# Patient Record
Sex: MALE | Race: WHITE
[De-identification: names, ages, dates, MRNs, and addresses within clinical notes are randomized per-mention and may not be internally consistent; named-entity substitution may affect disease eponyms.]

---

## 2018-12-01 ENCOUNTER — HOSPITAL ENCOUNTER (EMERGENCY)
Dept: HOSPITAL 75 - ER | Age: 43
LOS: 1 days | Discharge: HOME | End: 2018-12-02
Payer: COMMERCIAL

## 2018-12-01 VITALS — HEIGHT: 72 IN | WEIGHT: 185 LBS | BODY MASS INDEX: 25.06 KG/M2

## 2018-12-01 DIAGNOSIS — K04.7: Primary | ICD-10-CM

## 2018-12-01 PROCEDURE — 96372 THER/PROPH/DIAG INJ SC/IM: CPT

## 2018-12-01 PROCEDURE — 99284 EMERGENCY DEPT VISIT MOD MDM: CPT

## 2018-12-02 VITALS — DIASTOLIC BLOOD PRESSURE: 93 MMHG | SYSTOLIC BLOOD PRESSURE: 128 MMHG

## 2018-12-02 NOTE — ED EENT
History of Present Illness


General


Stated Complaint:  DENTAL PAIN


Source:  patient





History of Present Illness


Date Seen by Provider:  Dec 2, 2018


Time Seen by Provider:  00:52


Initial Comments


PT ARRIVES VIA POV


C/O DENTAL PAIN--RIGHT UPPER MOLAR AREA-SINCE WEDNESDAY


STATES HE HAS HAD INCREASED PAIN SINCE YESTERDAY


NOTICED SWELLING TODAY


HAS HAD SUBJECTIVE FEVER AND CHILLS


HAS EXTENSIVE DENTAL PROBLEMS BUT DENIES PRIOR PROBLEMS WITH THIS PARTICULAR 

TOOTH AND HAS NOT HAD ANY DENTAL WORK ON THIS PARTICULAR TOOTH


SAW HIS DENTIST, DR. MARTINI, 2 WEEKS AGO FOR A DIFFERENT PROBLEM


NO RECENT ANTIBIOTICS.





Allergies and Home Medications


Allergies


Coded Allergies:  


     No Known Drug Allergies (Unverified , 12/2/18)





Home Medications


Amoxicillin/Potassium Clav 1 Each Tablet, 1 EACH PO BID


   Prescribed by: DARIN PASTOR on 12/2/18 0117


Lidocaine HCl 15 Ml Solution, 15 ML MM Q 1-2 HOURS


   Prescribed by: DARIN PASTOR on 12/2/18 0117


Tramadol HCl 50 Mg Tablet, 50 MG PO Q4H


   Prescribed by: DARIN PASTOR on 12/2/18 0117





Patient Home Medication List


Home Medication List Reviewed:  Yes





Review of Systems


Review of Systems


Constitutional:  see HPI, chills, fever


Eyes:  No Symptoms Reported


Ears:  No Symptoms Reported


Nose:  no symptoms reported


Mouth:  see HPI, pain, swelling


Throat:  no symptoms reported


Respiratory:  no symptoms reported


Cardiovascular:  no symptoms reported


Gastrointestinal:  no symptoms reported


Musculoskeletal:  no symptoms reported


Skin:  no symptoms reported


Neurological:  No Symptoms Reported


Hematologic/Lymphatic:  No Symptoms Reported


Immunological/Allergic:  no symptoms reported





Past Medical-Social-Family Hx


Patient Social History


Recent Foreign Travel:  No


Contact w/Someone Who Travel:  No





Past Medical History


Surgeries:  No


Respiratory:  No


Cardiac:  No


Neurological:  No


Genitourinary:  No


Gastrointestinal:  No


Musculoskeletal:  No


Endocrine:  No


HEENT:  Yes (DENTAL )


Psychosocial:  No


Integumentary:  No


Blood Disorders:  No





Physical Exam


Height, Weight, BMI


Height: '"


Weight: lbs. oz. kg;  BMI


Method:


General Appearance:  WD/WN, no apparent distress


Eyes:  bilateral eye normal inspection, bilateral eye PERRL, bilateral eye EOMI


Ears:  bilateral ear auricle normal, bilateral ear canal normal, bilateral ear 

TM normal


Nose:  normal inspection


Mouth/Throat:  dental tenderness; No excessive drooling; mandibular swelling, 

maxillary swelling, trismus, other (RIGHT UPPER PREMOLAR WITH MARKED 

TENDERNESS. UPPER GUM SWELLING AND ERYTHEMA, NO AREAS OF FLUCTUANCE. + DENTAL 

DECAY. MODERATE SWELLING TO ENTIRE RIGHT CHEEK, EXTENDING TO JUST BELOW RIGHT 

EYE, AND DOWN TO MANDIBLE. PT HAVING DIFFICULTY OPENING MOUTH. RIGHT SIDE OF 

FACE ERYTHEMATOUS AND WARM AND TENDER TO TOUCH. )


Neck:  full range of motion, supple, lymphadenopathy (R)


Cardiovascular:  regular rate, rhythm, no murmur


Respiratory:  normal breath sounds


Neurologic/Psychiatric:  CNs II-XII nml as tested, no motor/sensory deficits, 

alert, normal mood/affect, oriented x 3


Skin:  normal color, warm/dry





Progress/Results/Core Measures


Results/Orders


My Orders








Progress


Progress Note :  


Progress Note


ADVISED IV, LABS, CT, AND IV ANTIBIOTICS AND PAIN MEDICATIONS AND PT ADAMANTLY 

REFUSES--STATES "JUST WANT SOME ANTIBIOTICS AND PAIN MEDICINES AND I'LL SEE MY 

DR. NEXT FRIDAY" 


STRONGLY ENCOURAGED PT TO SEE HIS DENTIST TOMORROW, BUT STATES HE IS WORKING 

OUT OF TOWN THIS WEEK AND WILL NOT BE BACK UNTIL THEN. 


STRONGLY ENCOURAGED TO SEEK TREATMENT SOONER THAN FRIDAY.





Departure


Impression





 Primary Impression:  


 DENTAL ABSCESS


Disposition:  01 HOME, SELF-CARE


Condition:  Stable





Departure-Patient Inst.


Referrals:  


SANGEETHA PATEL MD (PCP/Family)


Primary Care Physician


Patient Instructions:  Tooth Abscess (DC)





Add. Discharge Instructions:  


FREQUENT SALT WATER SWISHES





TYLENOL 1 GRAM AND MOTRIN 800 MG 4 TIMES A DAY FOR PAIN OR FEVER





LOTS OF CLEAR LIQUIDS





FOLLOW UP WITH YOUR DENTIST NEXT WEEK FOR FURTHER CARE, RETURN TO ER IF WORSE


Scripts


Tramadol HCl (Ultram) 50 Mg Tablet


50 MG PO Q4H, #20 TAB


   Prov: DARIN PASTOR DO         12/2/18 


Lidocaine HCl (Lidocaine HCl Viscous) 15 Ml Solution


15 ML MM Q 1-2 HOURS for Pain, #100 ML


   Prov: DARIN PASTOR DO         12/2/18 


Amoxicillin/Potassium Clav (Augmentin 875-125 Tablet) 1 Each Tablet


1 EACH PO BID for INFECTION, #20 TAB


   Prov: DARIN PASTOR DO         12/2/18











DARIN PASTOR DO Dec 2, 2018 01:01

## 2020-06-24 ENCOUNTER — HOSPITAL ENCOUNTER (OUTPATIENT)
Dept: HOSPITAL 75 - CARD | Age: 45
End: 2020-06-24
Attending: PHYSICIAN ASSISTANT
Payer: COMMERCIAL

## 2020-06-24 DIAGNOSIS — E78.2: ICD-10-CM

## 2020-06-24 DIAGNOSIS — I10: Primary | ICD-10-CM

## 2020-06-24 DIAGNOSIS — R60.9: ICD-10-CM

## 2020-06-24 PROCEDURE — 93306 TTE W/DOPPLER COMPLETE: CPT

## 2020-07-21 ENCOUNTER — HOSPITAL ENCOUNTER (OUTPATIENT)
Dept: HOSPITAL 75 - RAD | Age: 45
End: 2020-07-21
Attending: FAMILY MEDICINE
Payer: COMMERCIAL

## 2020-07-21 DIAGNOSIS — I10: Primary | ICD-10-CM

## 2020-07-21 DIAGNOSIS — R60.0: ICD-10-CM

## 2020-07-21 DIAGNOSIS — R80.9: ICD-10-CM

## 2020-07-21 PROCEDURE — 76770 US EXAM ABDO BACK WALL COMP: CPT

## 2020-07-21 PROCEDURE — 93975 VASCULAR STUDY: CPT

## 2020-07-21 NOTE — DIAGNOSTIC IMAGING REPORT
PROCEDURE: 

US Renal/Bladder.



TECHNIQUE: 

Multiple Real-time grayscale images were obtained over the

kidneys in various projections bilaterally. Color Doppler

ultrasound was also performed of the bilateral renal arteries.



INDICATION:  

Edema. Hypertension. Proteinuria.



COMPARISON:  

None.



FINDINGS: 



Right: 

The right kidney measures 12.7 cm in length. The renal cortical

thickness and echogenicity are within normal limits. There is no

evidence of calculi, solid focal mass, or hydronephrosis. No

perinephric fluid collections are identified.



Left: 

The left kidney measures 12.1 cm in length. The renal cortical

thickness and echogenicity are within normal limits. There is no

evidence of calculi, solid focal mass, or hydronephrosis. No

perinephric fluid collections are identified.



There is no abdominal ascites.  Views of the pelvis demonstrate a

mildly distended urinary bladder. Both ureteral jets are seen. No

large intraluminal filling defect or calculi are identified.



The abdominal aorta peak systolic velocity measures 118 cm/s.



The right renal artery peak systolic velocities measure 62 cm/s

in the proximal portion, 86 cm/s in the midportion, and 48 cm/s

in the distal portion. The max renal artery to aorta ratio is

0.73. The resistive indices in the arcuate arteries range from

0.6 to 0.68.



The left renal artery peak systolic velocities measure 47 cm/s in

the proximal portion, 53 cm/s in the midportion, and 44 cm/s in

the distal portion. The highest renal artery to aorta ratio is

0.45. The resistive indices in the arcuate arteries on the left

range from 0.5 to 0.6.



IMPRESSION:  

1. No sonographic evidence to suggest renal artery stenosis.



2. Unremarkable sonographic appearance of the kidneys. No

evidence of hydronephrosis or mass.



Dictated by: 



  Dictated on workstation # [id number]